# Patient Record
Sex: FEMALE | Race: BLACK OR AFRICAN AMERICAN | URBAN - METROPOLITAN AREA
[De-identification: names, ages, dates, MRNs, and addresses within clinical notes are randomized per-mention and may not be internally consistent; named-entity substitution may affect disease eponyms.]

---

## 2017-01-13 NOTE — PATIENT DISCUSSION
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

## 2017-01-13 NOTE — PATIENT DISCUSSION
(J52.564) Vitreous degeneration, left eye - Assesment : Examination revealed a posterior vitreous detachment, fresh. No holes or tears 360, no defects, hemorrhages  Patient has MD/OD in [id number] : Handouts given on posterior vitreous detachment and risk factors discussed for retinal detachment development. Advised to call immediately with any changes.  EMMANUEL OJEDA

## 2017-08-25 ENCOUNTER — IMPORTED ENCOUNTER (OUTPATIENT)
Dept: URBAN - METROPOLITAN AREA CLINIC 50 | Facility: CLINIC | Age: 47
End: 2017-08-25

## 2017-08-29 ENCOUNTER — IMPORTED ENCOUNTER (OUTPATIENT)
Dept: URBAN - METROPOLITAN AREA CLINIC 50 | Facility: CLINIC | Age: 47
End: 2017-08-29

## 2018-11-19 ENCOUNTER — APPOINTMENT (RX ONLY)
Dept: URBAN - METROPOLITAN AREA CLINIC 58 | Facility: CLINIC | Age: 48
Setting detail: DERMATOLOGY
End: 2018-11-19

## 2018-11-19 DIAGNOSIS — L74.51 PRIMARY FOCAL HYPERHIDROSIS: ICD-10-CM

## 2018-11-19 DIAGNOSIS — L82.1 OTHER SEBORRHEIC KERATOSIS: ICD-10-CM

## 2018-11-19 DIAGNOSIS — L40.0 PSORIASIS VULGARIS: ICD-10-CM

## 2018-11-19 DIAGNOSIS — L40.8 OTHER PSORIASIS: ICD-10-CM

## 2018-11-19 PROBLEM — L29.8 OTHER PRURITUS: Status: ACTIVE | Noted: 2018-11-19

## 2018-11-19 PROBLEM — L74.510 PRIMARY FOCAL HYPERHIDROSIS, AXILLA: Status: ACTIVE | Noted: 2018-11-19

## 2018-11-19 PROCEDURE — 99203 OFFICE O/P NEW LOW 30 MIN: CPT

## 2018-11-19 PROCEDURE — ? COUNSELING

## 2018-11-19 PROCEDURE — ? PRESCRIPTION

## 2018-11-19 RX ORDER — DESONIDE 0.5 MG/G
CREAM TOPICAL BID
Qty: 1 | Refills: 2 | Status: ERX | COMMUNITY
Start: 2018-11-19

## 2018-11-19 RX ORDER — FLUOCINOLONE ACETONIDE 0.11 MG/ML
OIL AURICULAR (OTIC)
Qty: 1 | Refills: 2 | Status: ERX | COMMUNITY
Start: 2018-11-19

## 2018-11-19 RX ORDER — CLOBETASOL PROPIONATE 0.5 MG/G
CREAM TOPICAL BID
Qty: 1 | Refills: 2 | Status: ERX | COMMUNITY
Start: 2018-11-19

## 2018-11-19 RX ADMIN — DESONIDE: 0.5 CREAM TOPICAL at 17:44

## 2018-11-19 RX ADMIN — CLOBETASOL PROPIONATE: 0.5 CREAM TOPICAL at 17:43

## 2018-11-19 RX ADMIN — FLUOCINOLONE ACETONIDE: 0.11 OIL AURICULAR (OTIC) at 17:41

## 2018-11-19 ASSESSMENT — LOCATION DETAILED DESCRIPTION DERM
LOCATION DETAILED: LEFT CAVUM CONCHA
LOCATION DETAILED: RIGHT CRUS OF HELIX
LOCATION DETAILED: LEFT INFERIOR CENTRAL MALAR CHEEK
LOCATION DETAILED: LEFT POSTERIOR NECK
LOCATION DETAILED: LEFT MID PREAURICULAR CHEEK
LOCATION DETAILED: RIGHT POSTERIOR NECK
LOCATION DETAILED: LEFT PROXIMAL PRETIBIAL REGION
LOCATION DETAILED: LEFT AXILLARY VAULT
LOCATION DETAILED: UPPER STERNUM
LOCATION DETAILED: RIGHT AXILLARY VAULT

## 2018-11-19 ASSESSMENT — LOCATION SIMPLE DESCRIPTION DERM
LOCATION SIMPLE: LEFT AXILLARY VAULT
LOCATION SIMPLE: LEFT EAR
LOCATION SIMPLE: RIGHT EAR
LOCATION SIMPLE: POSTERIOR NECK
LOCATION SIMPLE: RIGHT AXILLARY VAULT
LOCATION SIMPLE: LEFT PRETIBIAL REGION
LOCATION SIMPLE: CHEST
LOCATION SIMPLE: LEFT CHEEK

## 2018-11-19 ASSESSMENT — LOCATION ZONE DERM
LOCATION ZONE: EAR
LOCATION ZONE: TRUNK
LOCATION ZONE: FACE
LOCATION ZONE: NECK
LOCATION ZONE: AXILLAE
LOCATION ZONE: LEG

## 2018-11-19 NOTE — PROCEDURE: COUNSELING
Detail Level: Simple
Patient Specific Counseling (Will Not Stick From Patient To Patient): pt. will use clobetasol cream that she has at home bid x 3 weeks
Patient Specific Counseling (Will Not Stick From Patient To Patient): quoted 250.00 for cosmetic removal
Detail Level: Zone
Medical Necessity Clause: Botulinum toxin hyperhidrosis therapy is medically necessary because
Patient Specific Counseling (Will Not Stick From Patient To Patient): will discuss further on follow up appt.
Medical Necessity Information: LCD Guidelines vary from payer to payer. Please check with your payer's policy to determine medical necessity.

## 2019-07-12 ENCOUNTER — PREPPED CHART (OUTPATIENT)
Dept: URBAN - METROPOLITAN AREA CLINIC 52 | Facility: CLINIC | Age: 49
End: 2019-07-12

## 2019-07-12 ENCOUNTER — IMPORTED ENCOUNTER (OUTPATIENT)
Dept: URBAN - METROPOLITAN AREA CLINIC 50 | Facility: CLINIC | Age: 49
End: 2019-07-12

## 2019-07-12 NOTE — PATIENT DISCUSSION
Discussed with patient that she does not have glaucoma, HVF testing done 2017 and was normal. Unless patient IOP or ON change then HVF will be taken again to re-evaluate. ON stable, will continue to monitor. Recommend patient have a yearly dilated exam. Gave patient letter from todays visit to optom.

## 2021-04-08 ASSESSMENT — VISUAL ACUITY
OD_CC: 20/20
OU_SC: J3
OS_CC: 20/20

## 2021-04-08 ASSESSMENT — TONOMETRY
OS_IOP_MMHG: 17
OS_IOP_MMHG: 18
OD_IOP_MMHG: 19
OD_IOP_MMHG: 18

## 2021-04-17 ASSESSMENT — TONOMETRY
OD_IOP_MMHG: 18
OD_IOP_MMHG: 19
OD_IOP_MMHG: 20
OS_IOP_MMHG: 17
OS_IOP_MMHG: 20
OS_IOP_MMHG: 18

## 2021-04-17 ASSESSMENT — PACHYMETRY
OS_CT_UM: 528
OD_CT_UM: 530
OD_CT_UM: 530
OS_CT_UM: 528

## 2021-04-17 ASSESSMENT — VISUAL ACUITY
OS_CC: J3
OS_SC: 20/20
OD_CC: J1
OS_CC: J1
OD_SC: 20/25
OS_SC: 20/20
OD_SC: 20/20
OD_CC: J3

## 2021-04-19 ENCOUNTER — COMPREHENSIVE EXAM (OUTPATIENT)
Dept: URBAN - METROPOLITAN AREA CLINIC 49 | Facility: CLINIC | Age: 51
End: 2021-04-19

## 2021-04-19 DIAGNOSIS — H25.13: ICD-10-CM

## 2021-04-19 DIAGNOSIS — H52.4: ICD-10-CM

## 2021-04-19 PROCEDURE — 92014 COMPRE OPH EXAM EST PT 1/>: CPT

## 2021-04-19 PROCEDURE — 92015 DETERMINE REFRACTIVE STATE: CPT

## 2021-04-19 ASSESSMENT — VISUAL ACUITY
OD_SC: 20/30-1
OU_SC: J2
OS_SC: J7
OS_SC: 20/20
OU_SC: 20/20-1
OD_SC: J5
OD_PH: 20/25+2

## 2021-04-19 ASSESSMENT — TONOMETRY
OD_IOP_MMHG: 17
OS_IOP_MMHG: 17